# Patient Record
Sex: FEMALE | Race: OTHER
[De-identification: names, ages, dates, MRNs, and addresses within clinical notes are randomized per-mention and may not be internally consistent; named-entity substitution may affect disease eponyms.]

---

## 2019-11-25 ENCOUNTER — HOSPITAL ENCOUNTER (EMERGENCY)
Dept: HOSPITAL 54 - ER | Age: 34
Discharge: HOME | End: 2019-11-25
Payer: SELF-PAY

## 2019-11-25 VITALS — SYSTOLIC BLOOD PRESSURE: 131 MMHG | DIASTOLIC BLOOD PRESSURE: 103 MMHG

## 2019-11-25 VITALS — HEIGHT: 68 IN | BODY MASS INDEX: 20 KG/M2 | WEIGHT: 132 LBS
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
## 2019-11-25 DIAGNOSIS — S42.002A: Primary | ICD-10-CM

## 2019-11-25 RX ADMIN — Medication ONE EACH: at 22:25
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
